# Patient Record
Sex: MALE | Race: WHITE
[De-identification: names, ages, dates, MRNs, and addresses within clinical notes are randomized per-mention and may not be internally consistent; named-entity substitution may affect disease eponyms.]

---
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
## 2019-01-24 ENCOUNTER — HOSPITAL ENCOUNTER (EMERGENCY)
Dept: HOSPITAL 76 - ED | Age: 19
Discharge: HOME | End: 2019-01-24
Payer: MEDICAID

## 2019-01-24 VITALS — SYSTOLIC BLOOD PRESSURE: 134 MMHG | DIASTOLIC BLOOD PRESSURE: 76 MMHG

## 2019-01-24 DIAGNOSIS — J02.9: Primary | ICD-10-CM

## 2019-01-24 PROCEDURE — 87077 CULTURE AEROBIC IDENTIFY: CPT

## 2019-01-24 PROCEDURE — 99283 EMERGENCY DEPT VISIT LOW MDM: CPT

## 2019-01-24 PROCEDURE — 87070 CULTURE OTHR SPECIMN AEROBIC: CPT

## 2019-01-24 PROCEDURE — 87430 STREP A AG IA: CPT

## 2019-01-24 NOTE — ED PHYSICIAN DOCUMENTATION
History of Present Illness





- Stated complaint


Stated Complaint: SORE THROAT





- Chief complaint


Chief Complaint: Heent





- History obtained from


History obtained from: Patient





- History of Present Illness


Timing: Yesterday


Pain level max: 8


Pain level now: 8





- Additonal information


Additional information: 





18-year-old male with a sore throat since yesterday.  No fevers.  No coughing.  

Does feel like his lymph nodes were swollen.  Pain not improved with Tylenol.  

Worse with swallowing.  Nothing makes it better.  No rhinorrhea, congestion, ear

pain, etc.





Review of Systems


Constitutional: denies: Fever


Nose: denies: Rhinorrhea / runny nose, Congestion


Respiratory: denies: Cough


GI: denies: Abdominal Pain, Vomiting, Diarrhea


Skin: denies: Rash


Musculoskeletal: denies: Neck pain, Back pain


Neurologic: denies: Headache





PD PAST MEDICAL HISTORY





- Past Medical History


Past Medical History: No





- Past Surgical History


Past Surgical History: Yes


HEENT: Myringotomy (tubes)





- Present Medications


Home Medications: 


                                Ambulatory Orders











 Medication  Instructions  Recorded  Confirmed


 


LORazepam [Ativan] 0.5 mg PO Q8H PRN #10 tablet 06/07/16 


 


Acetaminophen/Cod 300/30 [Tylenol 1 each PO Q4-6H PRN #10 tablet 03/15/17 





#3]   


 


Guaifenesin/Pseudoephedrne HCl 1 each PO BID PRN #20 tab.er.12h 03/15/17 





[Mucinex D -60 mg Tablet]   


 


Ibuprofen [Motrin] 800 mg PO Q8H PRN #30 tablet 03/15/17 


 


Ibuprofen [Motrin] 800 mg PO Q8H PRN #30 tablet 01/24/19 


 


Penicillin V Potassium 500 mg PO Q6HR #40 tablet 01/24/19 














- Allergies


Allergies/Adverse Reactions: 


                                    Allergies











Allergy/AdvReac Type Severity Reaction Status Date / Time


 


peanuts Allergy  Anaphylaxis Uncoded 01/24/19 03:38














- Social History


Does the pt smoke?: No


Smoking Status: Never smoker


Does the pt drink ETOH?: No


Does the pt have substance abuse?: No





- Immunizations


Immunizations are current?: Yes





- POLST


Patient has POLST: No





PD ED PE NORMAL





- Vitals


Vital signs reviewed: Yes





- General


General: Alert and oriented X 3, No acute distress





- HEENT


HEENT: PERRL, Ears normal, Moist mucous membranes, Other (Posterior pharyngeal 

erythema with tonsillar exudates.  Uvula midline.  No peritonsillar abscess.  

Normal phonation.  No trismus)





- Neck


Neck: Supple, no meningeal sign, Other (Shotty anterior lymphadenopathy)





- Cardiac


Cardiac: RRR, Strong equal pulses





- Respiratory


Respiratory: No respiratory distress, Clear bilaterally





- Abdomen


Abdomen: Soft, Non tender, Non distended, No organomegaly





- Back


Back: No CVA TTP





- Derm


Derm: Warm and dry, No rash





- Extremities


Extremities: No edema





- Neuro


Neuro: Alert and oriented X 3





Results





- Vitals


Vitals: 


                               Vital Signs - 24 hr











  01/24/19





  03:25


 


Temperature 36.6 C


 


Heart Rate 84


 


Respiratory 16





Rate 


 


Blood Pressure 134/76 H


 


O2 Saturation 98








                                     Oxygen











O2 Source                      Room air

















- Labs


Labs: 


                                Laboratory Tests











  01/24/19





  03:24


 


Group A Strep Rapid  Negative














PD MEDICAL DECISION MAKING





- ED course


Complexity details: reviewed results, considered differential, d/w patient


ED course: 





18-year-old male with what appears to be pharyngitis.  Likely streptococcal 

based on the clinical exam.  Rapid strep is negative, but given the poor test 

characteristics, will treat based on clinical symptomology.  Also given 

dexamethasone.  Patient is well-appearing, nontoxic.  Afebrile.  Well-hydrated. 

 Patient counseled regarding signs and symptoms for which I believe and urgent 

re-evaluation would be necessary. Patient with good understanding of and 

agreement to plan and is comfortable going home at this time





This document was made in part using voice recognition software. While efforts 

are made to proofread this document, sound alike and grammatical errors may 

occur.





Departure





- Departure


Disposition: 01 Home, Self Care


Clinical Impression: 


Pharyngitis


Qualifiers:


 Pharyngitis/tonsillitis etiology: unspecified etiology Qualified Code(s): J02.9

 - Acute pharyngitis, unspecified





Condition: Good


Instructions:  ED Strep Pharyngitis Poss


Follow-Up: 


your,doctor in 1 week if not better [Other]


Prescriptions: 


Penicillin V Potassium 500 mg PO Q6HR #40 tablet


Ibuprofen [Motrin] 800 mg PO Q8H PRN #30 tablet


 PRN Reason: PAIN &/OR FEVER


Comments: 


Go home and rest.  Drink plenty of fluids.  Take all antibiotics until gone.  

Return if you worsen


Forms:  Activity restrictions


Discharge Date/Time: 01/24/19 04:13

## 2019-01-28 ENCOUNTER — HOSPITAL ENCOUNTER (EMERGENCY)
Dept: HOSPITAL 76 - ED | Age: 19
Discharge: HOME | End: 2019-01-28
Payer: COMMERCIAL

## 2019-01-28 VITALS — SYSTOLIC BLOOD PRESSURE: 139 MMHG | DIASTOLIC BLOOD PRESSURE: 89 MMHG

## 2019-01-28 DIAGNOSIS — J02.0: Primary | ICD-10-CM

## 2019-01-28 PROCEDURE — 99283 EMERGENCY DEPT VISIT LOW MDM: CPT

## 2019-01-28 NOTE — ED PHYSICIAN DOCUMENTATION
PD HPI HEENT





- Stated complaint


Stated Complaint: SOA





- Chief complaint


Chief Complaint: Resp





- History obtained from


History obtained from: Patient





- History of Present Illness


Timing - onset: How many days ago (3)


Timing - duration: Days (3)


Timing - details: Gradual onset


Pain level max: 3


Pain level now: 3


Severity Comments: mild


Location: Right ear, Left ear, Sinuses, Throat


Improves: Nothing


Worsens: Swalllowing


Associated symptoms: Fever, Congestion, Rhinorrhea, Swollen nodes.  No: Trismus,

Unable to swallow





Review of Systems


Ten Systems: 10 systems reviewed and negative


Constitutional: reports: Reviewed and negative


Eyes: reports: Reviewed and negative


Ears: reports: Reviewed and negative


Nose: reports: Reviewed and negative


Throat: reports: Reviewed and negative


Cardiac: reports: Reviewed and negative


Respiratory: reports: Reviewed and negative


GI: reports: Reviewed and negative


: reports: Reviewed and negative


Skin: reports: Reviewed and negative


Musculoskeletal: reports: Reviewed and negative


Neurologic: reports: Reviewed and negative


Psychiatric: reports: Reviewed and negative


Endocrine: reports: Reviewed and negative


Immunocompromised: reports: Reviewed and negative





PD PAST MEDICAL HISTORY





- Past Medical History


Other Past Medical History: Reviewed and not pertinent





- Past Surgical History


Past Surgical History: Yes


HEENT: Myringotomy (tubes)


Other past surgical history: Reviewed and not pertinent





- Present Medications


Home Medications: 


                                Ambulatory Orders











 Medication  Instructions  Recorded  Confirmed


 


Ibuprofen [Motrin] 800 mg PO Q8H PRN #30 tablet 01/24/19 


 


Penicillin V Potassium 500 mg PO Q6HR #40 tablet 01/24/19 


 


Lidocaine Viscous 2% [Xylocaine 15 ml MM Q4H #1 bottle 01/28/19 





Viscous 2%]   


 


Pseudoephedrine HCl [Sudafed 12 120 mg PO BID #24 tablet.er 01/28/19 





Hour]   














- Allergies


Allergies/Adverse Reactions: 


                                    Allergies











Allergy/AdvReac Type Severity Reaction Status Date / Time


 


peanuts Allergy  Anaphylaxis Uncoded 01/28/19 19:14














- Living Situation


Living Situation: reports: With family


Living Arrangement: reports: At home





- Social History


Does the pt smoke?: No


Smoking Status: Never smoker


Does the pt drink ETOH?: No


Does the pt have substance abuse?: No





- Family History


Family history: reports: Other (Reviewed and not pertinent)





- Immunizations


Immunizations are current?: Yes





- POLST


Patient has POLST: No





PD ED PE NORMAL





- Vitals


Vital signs reviewed: Yes





- General


General: Alert and oriented X 3, No acute distress





- HEENT


HEENT: PERRL, Other (Tonsilar erythema, no exudates, no uvular deviation)





- Neck


Neck: Supple, no meningeal sign





- Cardiac


Cardiac: RRR, No murmur





- Respiratory


Respiratory: Clear bilaterally





- Abdomen


Abdomen: Normal bowel sounds, Soft, Non tender, Non distended





- Derm


Derm: Warm and dry





- Extremities


Extremities: No deformity





- Neuro


Neuro: Alert and oriented X 3





- Psych


Psych: Normal mood, Normal affect





Results





- Vitals


Vitals: 


                               Vital Signs - 24 hr











  01/28/19





  19:09


 


Temperature 36.7 C


 


Heart Rate 92


 


Respiratory 18





Rate 


 


Blood Pressure 147/88 H


 


O2 Saturation 99








                                     Oxygen











O2 Source                      Room air

















PD MEDICAL DECISION MAKING





- ED course


Complexity details: reviewed results, re-evaluated patient, considered 

differential, d/w patient, d/w family


ED course: 





17 YO male w sore throat and upper respiratory symptoms. Given dose of decadron 

and discharged w symptomatic treatment. 





Departure





- Departure


Disposition: 01 Home, Self Care


Clinical Impression: 


 Strep pharyngitis, Acute upper respiratory infection





Condition: Good


Instructions:  ED Strep Pharyngitis Poss, ED Viral Syndrome


Follow-Up: 


Your, PCP [Other]


Prescriptions: 


Lidocaine Viscous 2% [Xylocaine Viscous 2%] 15 ml MM Q4H #1 bottle


Pseudoephedrine HCl [Sudafed 12 Hour] 120 mg PO BID #24 tablet.er


Comments: 


Use Afrin every 4 hours as needed for nasal congestion.  Take Sudafed as needed 

for nasal congestion.  Continue taking antibiotics, Tylenol, ibuprofen.  Use 

viscous lidocaine as needed for sore throat.  Follow-up with PCP within 24 

hours.  Return with worsening symptoms.